# Patient Record
(demographics unavailable — no encounter records)

---

## 2024-10-30 NOTE — REVIEW OF SYSTEMS
[Negative] : Heme/Lymph [Weight Loss (___ Lbs)] : [unfilled] ~Ulb weight loss [SOB] : no shortness of breath [Chest Discomfort] : no chest discomfort [Lower Ext Edema] : no extremity edema [Blood in stool] : no blood in stoo [Dizziness] : no dizziness [FreeTextEntry2] : Less fatigue

## 2024-10-30 NOTE — HISTORY OF PRESENT ILLNESS
[FreeTextEntry1] : Gonzalez Arechiga is an 86-year-old man with multiple medical problems, including hypertension, hyperlipidemia, severe CAD status post CABG (1991) and bioprosthetic MVR (2003), renal artery stenosis status post angioplasty, HFpEF, permanent atrial fibrillation, left atrial appendage thrombus (diagnosed in October 2022), anemia, and chronic kidney disease who returns for cardiac examination.  He has been feeling well in recent months and plans to spend the winter in [place], [place].  He says his hemoglobin is "on the upswing" and that bleeding has been excluded as an etiology.  He has not been experiencing palpitations, dyspnea, edema, or chest discomfort.  He offers no complaints and has been tolerating prescribed medications.  * This visit was conducted as part of ongoing, longitudinal medical care for patient's chronic medical diagnoses and other issues.

## 2024-10-30 NOTE — CARDIOLOGY SUMMARY
[de-identified] : 10/30/2024.  Atrial fibrillation.  Right bundle branch block. [de-identified] : 8/13/2024. Left ventricular systolic function is normal with an ejection fraction estimated at 65 to 70 %. Mild left ventricular hypertrophy. Enlarged right ventricular cavity size and probably normal right ventricular systolic function. Left atrium is severely dilated. The right atrium is severely dilated. Bioprosthetic valve present in the mitral position. Moderate to severe tricuspid regurgitation. Mild aortic stenosis. Mild pulmonic regurgitation. Moderate pulmonary hypertension. [de-identified] : 3/6/2023.  Mid LAD: 100% stenosis.  First diagonal: 70% stenosis. Proximal LCx: 100% stenosis. Mid RCA: 100% stenosis. SVG to RPDA: Mild atherosclerosis. LIMA to LAD: Brisk flow in the vessel to apex.

## 2024-10-30 NOTE — DISCUSSION/SUMMARY
[With ___] : with [unfilled] [FreeTextEntry1] :  Chronic diastolic heart failure: Stable; continue current doses of Bumex and Aldactone.  Atrial fibrillation: Permanent / controlled; tolerating Eliquis. He has declined Watchman implant. Continue Eliquis, metoprolol, and digoxin.  Coronary artery disease: Stable; continue metoprolol and simvastatin; no aspirin (due to Eliquis and increased bleeding risk).  Mitral valve replacement: Stable function.

## 2024-10-30 NOTE — REASON FOR VISIT
[Cardiac Failure] : cardiac failure [Arrhythmia/ECG Abnorrmalities] : arrhythmia/ECG abnormalities [Structural Heart and Valve Disease] : structural heart and valve disease [Hyperlipidemia] : hyperlipidemia [Hypertension] : hypertension [Coronary Artery Disease] : coronary artery disease

## 2024-10-30 NOTE — PHYSICAL EXAM
[No Acute Distress] : no acute distress [Clear Lung Fields] : clear lung fields [Normal Gait] : normal gait [Alert and Oriented] : alert and oriented [No Edema] : no edema [de-identified] : Appears stated age [de-identified] : Irregular, normal rate, systolic murmur

## 2025-07-18 NOTE — IMAGING
[Right] : right foot [de-identified] : The patient is a well appearing 86 year old male of their stated age. Patient ambulates with an antalgic gait. Negative straight leg raise.   Effected Foot: RIGHT Inspection: LARGE HEMATOMA BETWEEN 2ND AND 3RD METATARSAL Erythema: None Ecchymosis: SIGNIFICANT over entire foot  Abrasions: None Effusion: ++ Deformity: None Pes [place]: Negative Pes Cavus: Negative   Palpation:  Crepitus: None Medial Malleolus: Nontender Lateral Malleolus: Nontender Syndesmosis: Nontender Proximal Fibula: Nontender ATFL: Nontender CFL: Nontender Deltoid: Nontender Calcaneus: Nontender Talar Head/Neck: Nontender Peroneal Tendons: Nontender Posterior Tibialis: Nontender Achilles Tendon: Nontender & Intact Anterior Capsule: Nontender Hindfoot: Nontender Midfoot: TENDER AT SITE OF HEMATOMA Forefoot: Nontender  ROM: Ankle Dorsiflexion: 30 degrees Ankle Plantar Flexion: 45 degrees Motor: Dorsiflexion: 5 out of 5 Plantar Flexion: 5 out of 5 Inversion: 5 out of 5 Eversion: 5 out of 5 EHL: 5 out of 5 FHL: 5 out of 5   Provocative Testing: Anterior Drawer: Negative Syndesmosis Squeeze Test: Negative Brennan's Test: Negative Midfoot Stability/Rotation: Negative Heel Raise Inversion: Normal  Triple Hop: Normal Neurologic Exam: L4-S1 Sensation: Grossly Intact Vascular Exam/Pulses: Dorsalis Pedis: 2+ Posterior Tibialis: 2+ Capillary Refill: <2 Seconds Other Exams: None  Pertinent Contralateral Findings: None   Assessment: The patient is a 86 year old male with right foot pain and radiographic and physical exam findings consistent with significant brusing, no evidence of fracture on XR from [facility].  The patients condition is acute Documents/Results Reviewed Today: X-ray right foot from [facility] Tests/Studies Independently Interpreted Today: X-ray right foot reveals evidence of degenerative changes Pertinent findings include: large hematoma between 2nd and 3rd distal metatarsal, significant ecchymosis Confounding medical conditions/concerns: PMH anemia, HTN, CAD, high cholesterol, a-fib, thrombus, heart surgery (mitral valve replacement and bypass) on Eliquis - patient states he stopped taking Eliquis on 7/17/25 - has not discussed this with his cardiologist - patient reports no calf pain or difficulty breathing today.     Plan: Discussed extensive treatment options with the patient, his wife and daughter. There is no evidence of compartment syndrome at this time. I went over the symptoms of compartment syndrome with the patient, his wife and daughter including increased pain, pallor, paresthesia, paralysis, pulselessness, or change in temperature. They expressed understanding that they will immediately go to the emergency room if any of those symptoms appear.  I also placed dressings for the patient's laceration on his left shin that had mild blood drainage through his sutures. He reported that he will immediately restart Eliquis that he self-discontinued secondary to bleeding on his tibia; Head imaging at [facility] was negative and brain bleed was ruled out.  The patient will follow up with Dr. Barfield on 7/23/25 for his right foot and Dr. Lizama on 7/25/25 for his left wrist.  Tests Ordered: None Prescription Medications Ordered: Restart Eliquis; we discussed risks to discontinuing without physician guidance  Follow-Up: Dr. Lizama on 7/25/25 for left wrist and Dr. Barfield on 7/23/25 for the right foot.  The patient's current medication management of their orthopedic diagnosis was reviewed today: The patient declined and/or was contraindicated for the recommended prescription medication Naprosyn and will use over the counter Advil, Aleve, Voltaren Gel or Tylenol as directed. (1) We discussed a comprehensive treatment plan that included possible pharmaceutical management involving the use of prescription strength medications including but not limited to options such as oral Naprosyn 500mg BID, once daily Meloxicam 15 mg, or 500-650 mg Tylenol versus over the counter oral medications and topical prescription NSAID Pennsaid vs over the counter Voltaren gel.  Based on our extensive discussion, the patient declined prescription medication and will use over the counter Advil, Alleve, Voltaren Gel or Tylenol as directed. (2) There is a moderate risk of morbidity with further treatment, especially from use of prescription strength medications and possible side effects of these medications which include upset stomach with oral medications, skin reactions to topical medications and cardiac/renal issues with long term use. (3) I recommended that the patient follow-up with their medical physician to discuss any significant specific potential issues with long term medication use such as interactions with current medications or with exacerbation of underlying medical comorbidities. (4) The benefits and risks associated with use of injectable, oral or topical, prescription and over the counter anti-inflammatory medications were discussed with the patient. The patient voiced understanding of the risks including but not limited to bleeding, stroke, kidney dysfunction, heart disease, and were referred to the black box warning label for further information.  Tung DELEON attest that this documentation has been prepared under the direction and in the presence of Clari Ayala PA-C.  The documentation recorded by the scribe accurately reflects the service Clari DELEON PA-C, personally performed and the decisions made by me. [de-identified] :  x-ray right foot revealed evidence of degenerative changes

## 2025-07-18 NOTE — HISTORY OF PRESENT ILLNESS
[de-identified] : The patient is a 86 year old right hand dominant male who presents today for right foot pain. Date of Injury/Onset: 7/16/25 Pain: At Rest: 0/10 With Activity: 10/10 Mechanism of injury: Tripped and fell into a wooden flower box  This is NOT a Work Related Injury being treated under Worker's Compensation. This is NOT an athletic injury occurring associated with an interscholastic or organized sports team. Quality of symptoms: swelling, throbbing pain with laying down  Improves with: movement  Worse with: laying down Prior treatment: [facility] 7/16/25 Prior Imaging: XR 7/16/25 Out of work/sport: not working  School/Sport/Position/Occupation: retired  Additional Information: Also injured left wrist in the same fall - "relatively nondisplaced comminuted fracture of the distal radius subsequently splinted" PMH anemia, HTN, CAD, high cholesterol, a-fib, thrombus, heart surgery (mitral valve replacement and bypass) on Eliquis - patient states he stopped taking Eliquis on 7/16/25 - has not discussed this with his cardiologist.

## 2025-07-23 NOTE — ASSESSMENT
[FreeTextEntry1] : 87 yo male presenting today with right foot contusion, mild midfoot oa. X-rays negative. Recommend non-surgical management. -RLE WBAT -Avoid strenuous/impact related activities -Rest, ice, compression, elevation, NSAIDs PRN for pain. -All questions answered -F/u PRN   The diagnosis was explained in detail. The potential non-surgical and surgical treatments were reviewed. The relative risks and benefits of each option were considered relative to the patients age, activity level, medical history, symptom severity and previously attempted treatments.   The patient was advised to consult with their primary medical provider prior to initiation of any new medications to reduce the risk of adverse effects specific to their long-term home medications and medical history.   The patient's current medications management of their orthopedic diagnosis was reviewed today:   1) We discussed a comprehensive treatment plan that included possible pharmaceutical management involving the use of prescription strength medications including but not limited to options as oral Naprosyn 500mg BID, once daily Meloxicam 15 mg, or 500-650 mg Tylenol versus over-the-counter oral medications and topical prescriptions NSAID Pennsaid vs over the counter Voltaren gel.   2) There is a moderate risk of morbidity with further treatment, especially from use of prescription strength medications and possible side effects of these medications which include upset stomach with oral medications, skin reactions to topical medications and cardiac/renal issues with long term use.   3) I recommended that the patient follow-up with their medical physician to discuss any significant specific potential issues with long term medication use such as interactions with current medications or with exacerbation of underlying medical comorbidities.   4) The benefits and risks associated with use of injectable, oral or topical, prescription and over the counter anti-inflammatory medications were discussed with the patient. The patient voiced understanding of the risks including but not limited to bleeding, stroke, kidney dysfunction, heart disease, and were referred to the black box warning label for further information  Entered by Scooby Valentin PA-C acting as scribe. Dr. Barfield Attestation The documentation recorded by the scribe, in my presence, accurately reflects the service I, Dr. Barfield, personally performed, and the decisions made by me with my edits as appropriate.

## 2025-07-23 NOTE — PHYSICAL EXAM
[de-identified] : Examination of the left foot and ankle is as follows: INSPECTION: moderate diffuse dorsal foot swelling with associated ecchymosis but no abrasion, no laceration, but no gross deformity PALPATION: ttp over 3rd and 4th metatarsal shafts and generalized tenderness throughout the dorsum of the foot, no calf tenderness ROM: dorsiflexion 10 degrees, plantar flexion 20 degrees, inversion 15 degrees, eversion 10 degrees. STRENGTH: dorsiflexion 4/5. plantar flexion 4/5, inversion 4/5, eversion 4/5, EHL 4/5, FHL 4/5 VASCULAR: dorsalis pedis pulse: 1+, posterior tibialis pulse: 1+ NEURO: Sensation present to light touch in all distributions GAIT: patient uses wheelchair   X-rays of the left foot is as follows: [facility] Foot 3 view AP/Lateral/Oblique: mild midfoot djd, moderate to large soft tissue swelling of dorsum of foot

## 2025-07-23 NOTE — HISTORY OF PRESENT ILLNESS
[Sudden] : sudden [8] : 8 [3] : 3 [Dull/Aching] : dull/aching [Sharp] : sharp [Throbbing] : throbbing [Constant] : constant [Household chores] : household chores [Leisure] : leisure [Social interactions] : social interactions [Nothing helps with pain getting better] : Nothing helps with pain getting better [Standing] : standing [Walking] : walking [Lying in bed] : lying in bed [Retired] : Work status: retired [de-identified] : Patient presents to the office today with Right Foot pain. The date of injury was 7/16/25, he tripped and fell into a wooden flower box. Went to [facility] 7/16/25, imaging [facility] 7/16/25. Quality of symptoms: swelling, throbbing pain with laying down. Pain level is 3/10 at rest, weight bearing 8/10. Also injured left wrist in the same fall - "relatively nondisplaced comminuted fracture of the distal radius subsequently splinted" PMH anemia, HTN, CAD, high cholesterol, a-fib, thrombus, heart surgery (mitral valve replacement and bypass) on Eliquis - patient states he stopped taking Eliquis on 7/16/25 - has not discussed this with his cardiologist.  [] : no [FreeTextEntry1] : right foot [FreeTextEntry3] : 7/15/25 [FreeTextEntry5] : Tripped and fell into a wooden flower box [de-identified] : [facility]

## 2025-07-28 NOTE — HISTORY OF PRESENT ILLNESS
[de-identified] : Age: 86 year PMHx: Hand Dominance: RHD Chief Complaint:  LEFT WRIST fx Trauma: Patient tripped and fell over garden hose Outside Imaging/Treatment: [facility] 7/16/25 OTC Medications: OT/PT: none Bracing: splint at [facility] Pain worse with: nothing Pain better with: splint

## 2025-07-28 NOTE — ASSESSMENT
[FreeTextEntry1] : EXAM Left wrist in sugar tong splint. Able to make fist, oppose thumb to small finger and abduct fingers. Sensation intact throughout. <2sec cap refill.  Left wrist radiographs demonstrate distal radius fracture, comminuted intra-articular with alignment maintained post-reduction. (3-view from today as compared to outside facility as viewed by me)  ASSESSMENT/PLAN Left distal radius fracture - will manage with closed management [CPT 98125]  Reviewed radiographs with patient and discussed pathoanatomy. Discussed alignment is within acceptable parameters to manage with closed management in brace. Risk of pain, stiffness, malunion, nonunion, rotational malalignment, post-traumatic arthrosis, and displacement requiring further intervention. NWB, elevate, NSAIDs prn.  Acute complicated injury - risk of posttraumatic arthrosis, malunion, nonunion, stiffness, loss of function.  F/u 2weeks; reassess, repeat films; convert to brace

## 2025-07-28 NOTE — HISTORY OF PRESENT ILLNESS
[de-identified] : Age: 86 year PMHx: Hand Dominance: RHD Chief Complaint:  LEFT WRIST fx Trauma: Patient tripped and fell over garden hose Outside Imaging/Treatment: [facility] 7/16/25 OTC Medications: OT/PT: none Bracing: splint at [facility] Pain worse with: nothing Pain better with: splint

## 2025-07-28 NOTE — ASSESSMENT
[FreeTextEntry1] : EXAM Left wrist in sugar tong splint. Able to make fist, oppose thumb to small finger and abduct fingers. Sensation intact throughout. <2sec cap refill.  Left wrist radiographs demonstrate distal radius fracture, comminuted intra-articular with alignment maintained post-reduction. (3-view from today as compared to outside facility as viewed by me)  ASSESSMENT/PLAN Left distal radius fracture - will manage with closed management [CPT 06500]  Reviewed radiographs with patient and discussed pathoanatomy. Discussed alignment is within acceptable parameters to manage with closed management in brace. Risk of pain, stiffness, malunion, nonunion, rotational malalignment, post-traumatic arthrosis, and displacement requiring further intervention. NWB, elevate, NSAIDs prn.  Acute complicated injury - risk of posttraumatic arthrosis, malunion, nonunion, stiffness, loss of function.  F/u 2weeks; reassess, repeat films; convert to brace